# Patient Record
Sex: FEMALE | Race: WHITE | NOT HISPANIC OR LATINO | URBAN - METROPOLITAN AREA
[De-identification: names, ages, dates, MRNs, and addresses within clinical notes are randomized per-mention and may not be internally consistent; named-entity substitution may affect disease eponyms.]

---

## 2021-06-22 ENCOUNTER — OFFICE VISIT (OUTPATIENT)
Dept: URBAN - METROPOLITAN AREA CLINIC 27 | Facility: CLINIC | Age: 71
End: 2021-06-22

## 2021-06-22 PROBLEM — 266435005 GASTRO-ESOPHAGEAL REFLUX DISEASE WITHOUT ESOPHAGITIS: Status: ACTIVE | Noted: 2021-06-22

## 2021-06-22 PROBLEM — 14760008 CONSTIPATION: Status: ACTIVE | Noted: 2021-06-22

## 2021-06-22 PROBLEM — 363358000 MALIGNANT TUMOR OF LUNG: Status: ACTIVE | Noted: 2021-06-22

## 2021-06-22 PROBLEM — 59621000 ESSENTIAL HYPERTENSION: Status: ACTIVE | Noted: 2021-06-22

## 2021-07-26 ENCOUNTER — OFFICE VISIT (OUTPATIENT)
Dept: URBAN - METROPOLITAN AREA SURGERY CENTER 7 | Facility: SURGERY CENTER | Age: 71
End: 2021-07-26

## 2022-02-24 ENCOUNTER — OFFICE VISIT (OUTPATIENT)
Dept: URBAN - METROPOLITAN AREA CLINIC 27 | Facility: CLINIC | Age: 72
End: 2022-02-24

## 2022-02-24 PROBLEM — 271834000 ERUCTATION: Status: ACTIVE | Noted: 2022-02-24

## 2022-02-24 PROBLEM — 105997008 DISORDER OF BILIARY TRACT: Status: ACTIVE | Noted: 2022-02-24

## 2022-02-25 ENCOUNTER — LAB OUTSIDE AN ENCOUNTER (OUTPATIENT)
Dept: URBAN - METROPOLITAN AREA CLINIC 121 | Facility: CLINIC | Age: 72
End: 2022-02-25

## 2022-02-25 LAB
A/G RATIO: (no result)
ALBUMIN: 4.6
ALK PHOS: 84
APPEARANCE U: CLEAR
BACTERIA URN: (no result)
BILI DIRECT: 0.1
BILI INDIREC: (no result)
BILI TOTAL: 0.4
BILIRUBIN UR: NEGATIVE
EPI CELL UR: (no result)
GLOBULIN TOT: (no result)
GLUCOSE, URN: NEGATIVE
HYAL CAST UR: (no result)
KETONES URN: NEGATIVE
Lab: (no result)
Lab: NEGATIVE
PH URINE: 7.5
PROTEIN, TOT: 7.3
PROTEIN, URN: NEGATIVE
RBCS MICRO U: (no result)
SGOT (AST): 17
SGPT (ALT): 18
SPEC GR URIN: 1.01
WBCS MICRO U: (no result)
ZZ-GE-UNK: YELLOW

## 2022-04-30 ENCOUNTER — TELEPHONE ENCOUNTER (OUTPATIENT)
Dept: URBAN - METROPOLITAN AREA CLINIC 121 | Facility: CLINIC | Age: 72
End: 2022-04-30

## 2022-04-30 RX ORDER — OLMESARTAN MEDOXOMIL 20 MG/1
TABLET, FILM COATED ORAL
OUTPATIENT
Start: 2021-06-22

## 2022-04-30 RX ORDER — UMECLIDINIUM BROMIDE AND VILANTEROL TRIFENATATE 62.5; 25 UG/1; UG/1
POWDER RESPIRATORY (INHALATION)
OUTPATIENT
Start: 2021-06-22

## 2022-04-30 RX ORDER — B-COMPLEX WITH VITAMIN C
TABLET ORAL
OUTPATIENT
Start: 2021-06-22

## 2022-04-30 RX ORDER — HYDROCHLOROTHIAZIDE 25 MG/1
TABLET ORAL
OUTPATIENT
Start: 2021-06-22

## 2022-04-30 RX ORDER — LINACLOTIDE 290 UG/1
1 CAPSULE BY MOUTH ONCE A DAY CAPSULE, GELATIN COATED ORAL
OUTPATIENT
Start: 2021-06-22 | End: 2021-08-27

## 2022-04-30 RX ORDER — TRAMADOL HYDROCHLORIDE 50 MG/1
TABLET, FILM COATED ORAL
OUTPATIENT
Start: 2021-06-22

## 2022-04-30 RX ORDER — POTASSIUM CHLORIDE 750 MG/1
TABLET, EXTENDED RELEASE ORAL
OUTPATIENT
Start: 2021-06-22

## 2022-04-30 RX ORDER — CHOLECALCIFEROL (VITAMIN D3) 125 MCG
CAPSULE ORAL
OUTPATIENT
Start: 2021-06-22

## 2022-04-30 RX ORDER — FLAXSEED OIL 1000 MG
CAPSULE ORAL
OUTPATIENT
Start: 2021-06-22

## 2022-04-30 RX ORDER — LINACLOTIDE 290 UG/1
ONE CAPSULE BY MOUTH DAILY CAPSULE, GELATIN COATED ORAL
OUTPATIENT
Start: 2021-06-22

## 2022-04-30 RX ORDER — UBIDECARENONE/VIT E ACET 100MG-5
CAPSULE ORAL
OUTPATIENT
Start: 2021-06-22

## 2022-04-30 RX ORDER — CINNAMON BARK
POWDER (GRAM) MISCELLANEOUS
OUTPATIENT
Start: 2021-06-22

## 2022-04-30 RX ORDER — OMEGA-3S/DHA/EPA/FISH OIL 300-1000MG
CAPSULE ORAL
OUTPATIENT
Start: 2021-06-22

## 2022-04-30 RX ORDER — LINACLOTIDE 290 UG/1
TAKE 1 CAPSULE BY MOUTH ONCE A DAY TAKE ONE CAPSULE BY MOUTH ONE TIME DAILY IN THE MORNING 30 MINUTES BEFORE A MEAL CAPSULE, GELATIN COATED ORAL
OUTPATIENT
Start: 2021-08-27 | End: 2021-11-25

## 2022-04-30 RX ORDER — CALCIUM CARB/MAG/VITAMIN D2
CAPSULE ORAL
OUTPATIENT
Start: 2021-06-22

## 2022-05-01 ENCOUNTER — TELEPHONE ENCOUNTER (OUTPATIENT)
Dept: URBAN - METROPOLITAN AREA CLINIC 121 | Facility: CLINIC | Age: 72
End: 2022-05-01

## 2022-05-01 RX ORDER — LUBIPROSTONE 24 UG/1
TAKE 1 CAPSULE BY MOUTH TWICE A DAY 30-60 MINS AFTER MEALS CAPSULE, GELATIN COATED ORAL
Status: ACTIVE | COMMUNITY
Start: 2022-02-24 | End: 2022-06-24

## 2022-05-01 RX ORDER — FLAXSEED OIL 1000 MG
CAPSULE ORAL
Status: ACTIVE | COMMUNITY
Start: 2021-06-22

## 2022-05-01 RX ORDER — CHOLECALCIFEROL (VITAMIN D3) 125 MCG
CAPSULE ORAL
Status: ACTIVE | COMMUNITY
Start: 2021-06-22

## 2022-05-01 RX ORDER — OLMESARTAN MEDOXOMIL 20 MG/1
TABLET, FILM COATED ORAL
Status: ACTIVE | COMMUNITY
Start: 2021-06-22

## 2022-05-01 RX ORDER — UBIDECARENONE/VIT E ACET 100MG-5
CAPSULE ORAL
Status: ACTIVE | COMMUNITY
Start: 2021-06-22

## 2022-05-01 RX ORDER — LINACLOTIDE 290 UG/1
TAKE 1 CAPSULE BY MOUTH ONCE A DAY CAPSULE, GELATIN COATED ORAL
Status: ACTIVE | COMMUNITY
Start: 2022-03-30 | End: 2023-03-25

## 2022-05-01 RX ORDER — CINNAMON BARK
POWDER (GRAM) MISCELLANEOUS
Status: ACTIVE | COMMUNITY
Start: 2021-06-22

## 2022-05-01 RX ORDER — OMEGA-3S/DHA/EPA/FISH OIL 300-1000MG
CAPSULE ORAL
Status: ACTIVE | COMMUNITY
Start: 2021-06-22

## 2022-05-01 RX ORDER — HYDROCHLOROTHIAZIDE 25 MG/1
TABLET ORAL
Status: ACTIVE | COMMUNITY
Start: 2021-06-22

## 2022-05-01 RX ORDER — UMECLIDINIUM BROMIDE AND VILANTEROL TRIFENATATE 62.5; 25 UG/1; UG/1
POWDER RESPIRATORY (INHALATION)
Status: ACTIVE | COMMUNITY
Start: 2021-06-22

## 2022-05-01 RX ORDER — TRAMADOL HYDROCHLORIDE 50 MG/1
TABLET, FILM COATED ORAL
Status: ACTIVE | COMMUNITY
Start: 2021-06-22

## 2022-05-01 RX ORDER — B-COMPLEX WITH VITAMIN C
TABLET ORAL
Status: ACTIVE | COMMUNITY
Start: 2021-06-22

## 2022-05-01 RX ORDER — POTASSIUM CHLORIDE 750 MG/1
TABLET, EXTENDED RELEASE ORAL
Status: ACTIVE | COMMUNITY
Start: 2021-06-22

## 2022-06-23 ENCOUNTER — ERX REFILL RESPONSE (OUTPATIENT)
Dept: URBAN - METROPOLITAN AREA CLINIC 27 | Facility: CLINIC | Age: 72
End: 2022-06-23

## 2022-06-23 RX ORDER — LINACLOTIDE 290 UG/1
TAKE 1 CAPSULE BY MOUTH ONCE A DAY CAPSULE, GELATIN COATED ORAL
OUTPATIENT

## 2022-06-23 RX ORDER — LINACLOTIDE 290 UG/1
TAKE 1 CAPSULE BY MOUTH EVERY DAY IN THE MORNING 30 MINUTES BEFORE A MEAL CAPSULE, GELATIN COATED ORAL
Qty: 30 CAPSULE | Refills: 0 | OUTPATIENT

## 2022-07-21 ENCOUNTER — ERX REFILL RESPONSE (OUTPATIENT)
Dept: URBAN - METROPOLITAN AREA CLINIC 27 | Facility: CLINIC | Age: 72
End: 2022-07-21

## 2022-07-21 RX ORDER — LINACLOTIDE 290 UG/1
TAKE 1 CAPSULE BY MOUTH EVERY DAY IN THE MORNING 30 MINUTES BEFORE A MEAL CAPSULE, GELATIN COATED ORAL
Qty: 30 CAPSULE | Refills: 0 | OUTPATIENT

## 2022-07-21 RX ORDER — LINACLOTIDE 290 UG/1
TAKE 1 CAPSULE BY MOUTH EVERY DAY IN THE MORNING AND 30 MINUTES BEFORE A MEAL CAPSULE, GELATIN COATED ORAL
Qty: 30 CAPSULE | Refills: 0 | OUTPATIENT

## 2022-08-16 ENCOUNTER — ERX REFILL RESPONSE (OUTPATIENT)
Dept: URBAN - METROPOLITAN AREA CLINIC 27 | Facility: CLINIC | Age: 72
End: 2022-08-16

## 2022-08-16 RX ORDER — LINACLOTIDE 290 UG/1
TAKE 1 CAPSULE BY MOUTH EVERY DAY IN THE MORNING 30 MINUTES BEFORE A MEAL CAPSULE, GELATIN COATED ORAL
Qty: 30 CAPSULE | Refills: 0 | OUTPATIENT

## 2022-08-16 RX ORDER — LINACLOTIDE 290 UG/1
TAKE 1 CAPSULE BY MOUTH EVERY DAY IN THE MORNING AND 30 MINUTES BEFORE A MEAL CAPSULE, GELATIN COATED ORAL
Qty: 30 CAPSULE | Refills: 0 | OUTPATIENT

## 2022-10-14 ENCOUNTER — ERX REFILL RESPONSE (OUTPATIENT)
Dept: URBAN - METROPOLITAN AREA CLINIC 27 | Facility: CLINIC | Age: 72
End: 2022-10-14

## 2022-10-14 RX ORDER — LINACLOTIDE 290 UG/1
TAKE 1 CAPSULE BY MOUTH EVERY DAY IN THE MORNING AND 30 MINUTES BEFORE A MEAL CAPSULE, GELATIN COATED ORAL
Qty: 30 CAPSULE | Refills: 0 | OUTPATIENT

## 2022-10-14 RX ORDER — LINACLOTIDE 290 UG/1
TAKE 1 CAPSULE BY MOUTH EVERY DAY IN THE MORNING 30 MINUTES BEFORE A MEAL CAPSULE, GELATIN COATED ORAL
Qty: 30 CAPSULE | Refills: 0 | OUTPATIENT

## 2022-11-11 ENCOUNTER — ERX REFILL RESPONSE (OUTPATIENT)
Dept: URBAN - METROPOLITAN AREA CLINIC 27 | Facility: CLINIC | Age: 72
End: 2022-11-11

## 2022-11-11 RX ORDER — LINACLOTIDE 290 UG/1
TAKE 1 CAPSULE BY MOUTH EVERY DAY IN THE MORNING AND 30 MINUTES BEFORE A MEAL CAPSULE, GELATIN COATED ORAL
Qty: 30 CAPSULE | Refills: 0 | OUTPATIENT

## 2022-11-11 RX ORDER — LINACLOTIDE 290 UG/1
TAKE 1 CAPSULE BY MOUTH EVERY DAY IN THE MORNING, 30 MINUTES BEFORE A MEAL CAPSULE, GELATIN COATED ORAL
Qty: 30 CAPSULE | Refills: 0 | OUTPATIENT

## 2023-01-09 ENCOUNTER — OFFICE VISIT (OUTPATIENT)
Dept: URBAN - METROPOLITAN AREA CLINIC 27 | Facility: CLINIC | Age: 73
End: 2023-01-09
Payer: MEDICARE

## 2023-01-09 VITALS
HEIGHT: 62 IN | BODY MASS INDEX: 25.21 KG/M2 | WEIGHT: 137 LBS | HEART RATE: 128 BPM | SYSTOLIC BLOOD PRESSURE: 123 MMHG | DIASTOLIC BLOOD PRESSURE: 96 MMHG

## 2023-01-09 DIAGNOSIS — K58.1 IRRITABLE BOWEL SYNDROME WITH CONSTIPATION: ICD-10-CM

## 2023-01-09 PROCEDURE — 99213 OFFICE O/P EST LOW 20 MIN: CPT | Performed by: INTERNAL MEDICINE

## 2023-01-09 RX ORDER — TRIAMTERENE AND HYDROCHLOROTHIAZIDE 37.5; 25 MG/1; MG/1
CAPSULE ORAL
Qty: 0 | Refills: 0 | Status: ACTIVE | COMMUNITY
Start: 1900-01-01 | End: 1900-01-01

## 2023-01-09 RX ORDER — POTASSIUM CHLORIDE 750 MG/1
TABLET, FILM COATED, EXTENDED RELEASE ORAL
Qty: 0 | Refills: 0 | Status: ACTIVE | COMMUNITY
Start: 1900-01-01 | End: 1900-01-01

## 2023-01-09 RX ORDER — CHOLECALCIFEROL (VITAMIN D3) 125 MCG
CAPSULE ORAL
Status: ACTIVE | COMMUNITY
Start: 2021-06-22

## 2023-01-09 RX ORDER — LINACLOTIDE 290 UG/1
TAKE 1 CAPSULE BY MOUTH EVERY DAY IN THE MORNING, 30 MINUTES BEFORE A MEAL CAPSULE, GELATIN COATED ORAL
Qty: 30 CAPSULE | Refills: 0 | Status: ACTIVE | COMMUNITY

## 2023-01-09 RX ORDER — FLAXSEED OIL 1000 MG
CAPSULE ORAL
Status: ACTIVE | COMMUNITY
Start: 2021-06-22

## 2023-01-09 RX ORDER — UBIDECARENONE/VIT E ACET 100MG-5
CAPSULE ORAL
Status: ACTIVE | COMMUNITY
Start: 2021-06-22

## 2023-01-09 RX ORDER — SIMVASTATIN 10 MG/1
TABLET, FILM COATED ORAL
Qty: 0 | Refills: 0 | Status: ACTIVE | COMMUNITY
Start: 1900-01-01 | End: 1900-01-01

## 2023-01-09 RX ORDER — OLMESARTAN MEDOXOMIL 20 MG/1
TABLET, FILM COATED ORAL
Status: ACTIVE | COMMUNITY
Start: 2021-06-22

## 2023-01-09 RX ORDER — CINNAMON BARK
POWDER (GRAM) MISCELLANEOUS
Status: ACTIVE | COMMUNITY
Start: 2021-06-22

## 2023-01-09 RX ORDER — B-COMPLEX WITH VITAMIN C
TABLET ORAL
Status: ACTIVE | COMMUNITY
Start: 2021-06-22

## 2023-01-09 RX ORDER — TRAMADOL HYDROCHLORIDE 50 MG/1
TABLET, FILM COATED ORAL
Status: ACTIVE | COMMUNITY
Start: 2021-06-22

## 2023-01-09 RX ORDER — OMEGA-3S/DHA/EPA/FISH OIL 300-1000MG
CAPSULE ORAL
Status: ACTIVE | COMMUNITY
Start: 2021-06-22

## 2023-01-09 RX ORDER — POTASSIUM CHLORIDE 750 MG/1
TABLET, EXTENDED RELEASE ORAL
Status: ACTIVE | COMMUNITY
Start: 2021-06-22

## 2023-01-09 RX ORDER — HYDROCHLOROTHIAZIDE 25 MG/1
TABLET ORAL
Status: ACTIVE | COMMUNITY
Start: 2021-06-22

## 2023-01-09 RX ORDER — UMECLIDINIUM BROMIDE AND VILANTEROL TRIFENATATE 62.5; 25 UG/1; UG/1
POWDER RESPIRATORY (INHALATION)
Status: ACTIVE | COMMUNITY
Start: 2021-06-22

## 2023-01-09 NOTE — HPI-TODAY'S VISIT:
72-year-old female here for follow-up of constipation, abdominal pain.  She continues to have irregular bowel habits, constipation predominant.  She has occasional diarrhea.  She has abdominal pain related to the constipation, improved with bowel movements.  Denies rectal bleeding, weight loss.  She is on 290 mcg Linzess.

## 2023-02-01 ENCOUNTER — WEB ENCOUNTER (OUTPATIENT)
Dept: URBAN - METROPOLITAN AREA CLINIC 27 | Facility: CLINIC | Age: 73
End: 2023-02-01

## 2023-02-01 RX ORDER — LINACLOTIDE 290 UG/1
TAKE 1 CAPSULE BY MOUTH EVERY DAY IN THE MORNING, 30 MINUTES BEFORE A MEAL CAPSULE, GELATIN COATED ORAL ONCE A DAY
Qty: 30 | Refills: 3

## 2023-04-06 ENCOUNTER — OFFICE VISIT (OUTPATIENT)
Dept: URBAN - METROPOLITAN AREA CLINIC 27 | Facility: CLINIC | Age: 73
End: 2023-04-06
Payer: MEDICARE

## 2023-04-06 VITALS
SYSTOLIC BLOOD PRESSURE: 149 MMHG | HEART RATE: 99 BPM | HEIGHT: 62 IN | DIASTOLIC BLOOD PRESSURE: 95 MMHG | WEIGHT: 140.6 LBS | BODY MASS INDEX: 25.88 KG/M2

## 2023-04-06 DIAGNOSIS — K58.1 IRRITABLE BOWEL SYNDROME WITH CONSTIPATION: ICD-10-CM

## 2023-04-06 PROCEDURE — 99214 OFFICE O/P EST MOD 30 MIN: CPT | Performed by: INTERNAL MEDICINE

## 2023-04-06 RX ORDER — FLAXSEED OIL 1000 MG
CAPSULE ORAL
Status: ACTIVE | COMMUNITY
Start: 2021-06-22

## 2023-04-06 RX ORDER — CHOLECALCIFEROL (VITAMIN D3) 125 MCG
CAPSULE ORAL
Status: ACTIVE | COMMUNITY
Start: 2021-06-22

## 2023-04-06 RX ORDER — CINNAMON BARK
POWDER (GRAM) MISCELLANEOUS
Status: ACTIVE | COMMUNITY
Start: 2021-06-22

## 2023-04-06 RX ORDER — UBIDECARENONE/VIT E ACET 100MG-5
CAPSULE ORAL
Status: ACTIVE | COMMUNITY
Start: 2021-06-22

## 2023-04-06 RX ORDER — OLMESARTAN MEDOXOMIL 20 MG/1
TABLET, FILM COATED ORAL
Status: ACTIVE | COMMUNITY
Start: 2021-06-22

## 2023-04-06 RX ORDER — TRIAMTERENE AND HYDROCHLOROTHIAZIDE 37.5; 25 MG/1; MG/1
CAPSULE ORAL
Qty: 0 | Refills: 0 | Status: ACTIVE | COMMUNITY
Start: 1900-01-01 | End: 1900-01-01

## 2023-04-06 RX ORDER — POTASSIUM CHLORIDE 750 MG/1
TABLET, EXTENDED RELEASE ORAL
Status: ACTIVE | COMMUNITY
Start: 2021-06-22

## 2023-04-06 RX ORDER — SIMVASTATIN 10 MG/1
TABLET, FILM COATED ORAL
Qty: 0 | Refills: 0 | Status: ACTIVE | COMMUNITY
Start: 1900-01-01 | End: 1900-01-01

## 2023-04-06 RX ORDER — POTASSIUM CHLORIDE 750 MG/1
TABLET, FILM COATED, EXTENDED RELEASE ORAL
Qty: 0 | Refills: 0 | Status: ACTIVE | COMMUNITY
Start: 1900-01-01 | End: 1900-01-01

## 2023-04-06 RX ORDER — LINACLOTIDE 290 UG/1
TAKE 1 CAPSULE BY MOUTH EVERY DAY IN THE MORNING, 30 MINUTES BEFORE A MEAL CAPSULE, GELATIN COATED ORAL ONCE A DAY
Qty: 30 | Refills: 3 | Status: ACTIVE | COMMUNITY

## 2023-04-06 RX ORDER — OMEGA-3S/DHA/EPA/FISH OIL 300-1000MG
CAPSULE ORAL
Status: ACTIVE | COMMUNITY
Start: 2021-06-22

## 2023-04-06 RX ORDER — LACTULOSE 10 G/15ML
15 ML AS NEEDED SOLUTION ORAL ONCE A DAY
Qty: 450 | Refills: 3 | OUTPATIENT
Start: 2023-04-06 | End: 2023-08-04

## 2023-04-06 RX ORDER — B-COMPLEX WITH VITAMIN C
TABLET ORAL
Status: ACTIVE | COMMUNITY
Start: 2021-06-22

## 2023-04-06 RX ORDER — TRAMADOL HYDROCHLORIDE 50 MG/1
TABLET, FILM COATED ORAL
Status: ACTIVE | COMMUNITY
Start: 2021-06-22

## 2023-04-06 RX ORDER — HYDROCHLOROTHIAZIDE 25 MG/1
TABLET ORAL
Status: ACTIVE | COMMUNITY
Start: 2021-06-22

## 2023-04-06 RX ORDER — UMECLIDINIUM BROMIDE AND VILANTEROL TRIFENATATE 62.5; 25 UG/1; UG/1
POWDER RESPIRATORY (INHALATION)
Status: ACTIVE | COMMUNITY
Start: 2021-06-22

## 2023-04-06 NOTE — HPI-TODAY'S VISIT:
72-year-old female here for follow-up of constipation.  She was not having a complete response to Linzess to 90 mcg, so she was given samples of IBS Rella and Trulance, but had worse outcome with those 2 medications, so she switched back to Linzess.  Still not having complete response to Linzess 290 mcg.

## 2023-04-07 ENCOUNTER — WEB ENCOUNTER (OUTPATIENT)
Dept: URBAN - METROPOLITAN AREA SURGERY CENTER 7 | Facility: SURGERY CENTER | Age: 73
End: 2023-04-07

## 2023-04-10 ENCOUNTER — WEB ENCOUNTER (OUTPATIENT)
Dept: URBAN - METROPOLITAN AREA CLINIC 27 | Facility: CLINIC | Age: 73
End: 2023-04-10

## 2023-05-10 ENCOUNTER — P2P PATIENT RECORD (OUTPATIENT)
Age: 73
End: 2023-05-10

## 2023-06-05 ENCOUNTER — TELEPHONE ENCOUNTER (OUTPATIENT)
Dept: URBAN - METROPOLITAN AREA CLINIC 27 | Facility: CLINIC | Age: 73
End: 2023-06-05

## 2023-06-05 RX ORDER — LINACLOTIDE 290 UG/1
TAKE 1 CAPSULE BY MOUTH EVERY DAY IN THE MORNING, 30 MINUTES BEFORE A MEAL CAPSULE, GELATIN COATED ORAL ONCE A DAY
Qty: 30 | Refills: 3
End: 2023-10-03

## 2023-06-06 ENCOUNTER — TELEPHONE ENCOUNTER (OUTPATIENT)
Dept: URBAN - METROPOLITAN AREA CLINIC 27 | Facility: CLINIC | Age: 73
End: 2023-06-06

## 2023-07-06 ENCOUNTER — OFFICE VISIT (OUTPATIENT)
Dept: URBAN - METROPOLITAN AREA CLINIC 27 | Facility: CLINIC | Age: 73
End: 2023-07-06
Payer: MEDICARE

## 2023-07-06 VITALS
DIASTOLIC BLOOD PRESSURE: 98 MMHG | SYSTOLIC BLOOD PRESSURE: 147 MMHG | HEIGHT: 62 IN | BODY MASS INDEX: 25.76 KG/M2 | RESPIRATION RATE: 17 BRPM | HEART RATE: 124 BPM | WEIGHT: 140 LBS

## 2023-07-06 DIAGNOSIS — K59.09 CHRONIC CONSTIPATION: ICD-10-CM

## 2023-07-06 DIAGNOSIS — Z86.010 PERSONAL HISTORY OF COLONIC POLYPS: ICD-10-CM

## 2023-07-06 PROBLEM — 428283002 HISTORY OF POLYP OF COLON (SITUATION): Status: ACTIVE | Noted: 2021-06-22

## 2023-07-06 PROCEDURE — 99214 OFFICE O/P EST MOD 30 MIN: CPT | Performed by: INTERNAL MEDICINE

## 2023-07-06 RX ORDER — B-COMPLEX WITH VITAMIN C
TABLET ORAL
Status: ACTIVE | COMMUNITY
Start: 2021-06-22

## 2023-07-06 RX ORDER — POTASSIUM CHLORIDE 750 MG/1
TABLET, FILM COATED, EXTENDED RELEASE ORAL
Qty: 0 | Refills: 0 | Status: ACTIVE | COMMUNITY
Start: 1900-01-01 | End: 1900-01-01

## 2023-07-06 RX ORDER — CINNAMON BARK
POWDER (GRAM) MISCELLANEOUS
Status: ACTIVE | COMMUNITY
Start: 2021-06-22

## 2023-07-06 RX ORDER — UMECLIDINIUM BROMIDE AND VILANTEROL TRIFENATATE 62.5; 25 UG/1; UG/1
POWDER RESPIRATORY (INHALATION)
Status: ACTIVE | COMMUNITY
Start: 2021-06-22

## 2023-07-06 RX ORDER — HYDROCHLOROTHIAZIDE 25 MG/1
TABLET ORAL
Status: ACTIVE | COMMUNITY
Start: 2021-06-22

## 2023-07-06 RX ORDER — SIMVASTATIN 10 MG/1
TABLET, FILM COATED ORAL
Qty: 0 | Refills: 0 | Status: ACTIVE | COMMUNITY
Start: 1900-01-01 | End: 1900-01-01

## 2023-07-06 RX ORDER — OLMESARTAN MEDOXOMIL 20 MG/1
TABLET, FILM COATED ORAL
Status: ACTIVE | COMMUNITY
Start: 2021-06-22

## 2023-07-06 RX ORDER — UBIDECARENONE/VIT E ACET 100MG-5
CAPSULE ORAL
Status: ACTIVE | COMMUNITY
Start: 2021-06-22

## 2023-07-06 RX ORDER — TRIAMTERENE AND HYDROCHLOROTHIAZIDE 37.5; 25 MG/1; MG/1
CAPSULE ORAL
Qty: 0 | Refills: 0 | Status: ACTIVE | COMMUNITY
Start: 1900-01-01 | End: 1900-01-01

## 2023-07-06 RX ORDER — POTASSIUM CHLORIDE 750 MG/1
TABLET, EXTENDED RELEASE ORAL
Status: ACTIVE | COMMUNITY
Start: 2021-06-22

## 2023-07-06 RX ORDER — FLAXSEED OIL 1000 MG
CAPSULE ORAL
Status: ACTIVE | COMMUNITY
Start: 2021-06-22

## 2023-07-06 RX ORDER — LACTULOSE 10 G/15ML
15 ML AS NEEDED SOLUTION ORAL ONCE A DAY
Qty: 450 | Refills: 3 | Status: ACTIVE | COMMUNITY
Start: 2023-04-06 | End: 2023-08-04

## 2023-07-06 RX ORDER — TRAMADOL HYDROCHLORIDE 50 MG/1
TABLET, FILM COATED ORAL
Status: ACTIVE | COMMUNITY
Start: 2021-06-22

## 2023-07-06 RX ORDER — OMEGA-3S/DHA/EPA/FISH OIL 300-1000MG
CAPSULE ORAL
Status: ACTIVE | COMMUNITY
Start: 2021-06-22

## 2023-07-06 RX ORDER — LINACLOTIDE 290 UG/1
TAKE 1 CAPSULE BY MOUTH EVERY DAY IN THE MORNING, 30 MINUTES BEFORE A MEAL CAPSULE, GELATIN COATED ORAL ONCE A DAY
Qty: 30 | Refills: 3 | Status: ACTIVE | COMMUNITY
End: 2023-10-03

## 2023-07-06 RX ORDER — CHOLECALCIFEROL (VITAMIN D3) 125 MCG
CAPSULE ORAL
Status: ACTIVE | COMMUNITY
Start: 2021-06-22

## 2023-07-06 NOTE — HPI-TODAY'S VISIT:
73-year-old female here for follow-up of constipation.  She is currently on Linzess to 90 mcg which has helped, but continues to have some constipation and left lower quadrant pain associated with bowel movements.  She was given lactulose to use in addition to Linzess, but only uses it occasionally.  She has tried Trulance and IBS Rella without much improvement in the past.

## 2023-08-18 ENCOUNTER — TELEPHONE ENCOUNTER (OUTPATIENT)
Dept: URBAN - METROPOLITAN AREA CLINIC 27 | Facility: CLINIC | Age: 73
End: 2023-08-18

## 2023-08-18 RX ORDER — LINACLOTIDE 290 UG/1
1 CAPSULE AT LEAST 30 MINUTES BEFORE THE FIRST MEAL OF THE DAY ON AN EMPTY STOMACH CAPSULE, GELATIN COATED ORAL ONCE A DAY
Qty: 30 | Refills: 3 | OUTPATIENT
Start: 2023-08-18 | End: 2023-12-15

## 2023-08-21 ENCOUNTER — TELEPHONE ENCOUNTER (OUTPATIENT)
Dept: URBAN - METROPOLITAN AREA CLINIC 27 | Facility: CLINIC | Age: 73
End: 2023-08-21

## 2023-10-05 ENCOUNTER — OFFICE VISIT (OUTPATIENT)
Dept: URBAN - METROPOLITAN AREA CLINIC 27 | Facility: CLINIC | Age: 73
End: 2023-10-05

## 2023-10-16 ENCOUNTER — OFFICE VISIT (OUTPATIENT)
Dept: URBAN - METROPOLITAN AREA CLINIC 27 | Facility: CLINIC | Age: 73
End: 2023-10-16

## 2023-10-19 ENCOUNTER — OFFICE VISIT (OUTPATIENT)
Dept: URBAN - METROPOLITAN AREA CLINIC 27 | Facility: CLINIC | Age: 73
End: 2023-10-19

## 2023-10-30 ENCOUNTER — DASHBOARD ENCOUNTERS (OUTPATIENT)
Age: 73
End: 2023-10-30

## 2023-10-30 ENCOUNTER — OFFICE VISIT (OUTPATIENT)
Dept: URBAN - METROPOLITAN AREA CLINIC 27 | Facility: CLINIC | Age: 73
End: 2023-10-30
Payer: MEDICARE

## 2023-10-30 VITALS
BODY MASS INDEX: 25.4 KG/M2 | WEIGHT: 138 LBS | HEIGHT: 62 IN | DIASTOLIC BLOOD PRESSURE: 107 MMHG | SYSTOLIC BLOOD PRESSURE: 187 MMHG | HEART RATE: 105 BPM

## 2023-10-30 DIAGNOSIS — K58.1 IRRITABLE BOWEL SYNDROME WITH CONSTIPATION: ICD-10-CM

## 2023-10-30 DIAGNOSIS — Z86.010 PERSONAL HISTORY OF COLONIC POLYPS: ICD-10-CM

## 2023-10-30 PROBLEM — 440630006: Status: ACTIVE | Noted: 2023-01-09

## 2023-10-30 PROCEDURE — 99213 OFFICE O/P EST LOW 20 MIN: CPT | Performed by: INTERNAL MEDICINE

## 2023-10-30 RX ORDER — UBIDECARENONE/VIT E ACET 100MG-5
CAPSULE ORAL
Status: ACTIVE | COMMUNITY
Start: 2021-06-22

## 2023-10-30 RX ORDER — POTASSIUM CHLORIDE 750 MG/1
TABLET, FILM COATED, EXTENDED RELEASE ORAL
Qty: 0 | Refills: 0 | Status: ACTIVE | COMMUNITY
Start: 1900-01-01 | End: 1900-01-01

## 2023-10-30 RX ORDER — OLMESARTAN MEDOXOMIL 20 MG/1
TABLET, FILM COATED ORAL
Status: ACTIVE | COMMUNITY
Start: 2021-06-22

## 2023-10-30 RX ORDER — SIMVASTATIN 10 MG/1
TABLET, FILM COATED ORAL
Qty: 0 | Refills: 0 | Status: ACTIVE | COMMUNITY
Start: 1900-01-01 | End: 1900-01-01

## 2023-10-30 RX ORDER — POTASSIUM CHLORIDE 750 MG/1
TABLET, EXTENDED RELEASE ORAL
Status: ACTIVE | COMMUNITY
Start: 2021-06-22

## 2023-10-30 RX ORDER — HYDROCHLOROTHIAZIDE 25 MG/1
TABLET ORAL
Status: ACTIVE | COMMUNITY
Start: 2021-06-22

## 2023-10-30 RX ORDER — B-COMPLEX WITH VITAMIN C
TABLET ORAL
Status: ACTIVE | COMMUNITY
Start: 2021-06-22

## 2023-10-30 RX ORDER — UMECLIDINIUM BROMIDE AND VILANTEROL TRIFENATATE 62.5; 25 UG/1; UG/1
POWDER RESPIRATORY (INHALATION)
Status: ACTIVE | COMMUNITY
Start: 2021-06-22

## 2023-10-30 RX ORDER — CINNAMON BARK
POWDER (GRAM) MISCELLANEOUS
Status: ACTIVE | COMMUNITY
Start: 2021-06-22

## 2023-10-30 RX ORDER — FLAXSEED OIL 1000 MG
CAPSULE ORAL
Status: ACTIVE | COMMUNITY
Start: 2021-06-22

## 2023-10-30 RX ORDER — OMEGA-3S/DHA/EPA/FISH OIL 300-1000MG
CAPSULE ORAL
Status: ACTIVE | COMMUNITY
Start: 2021-06-22

## 2023-10-30 RX ORDER — TRIAMTERENE AND HYDROCHLOROTHIAZIDE 37.5; 25 MG/1; MG/1
CAPSULE ORAL
Qty: 0 | Refills: 0 | Status: ACTIVE | COMMUNITY
Start: 1900-01-01 | End: 1900-01-01

## 2023-10-30 RX ORDER — CHOLECALCIFEROL (VITAMIN D3) 125 MCG
CAPSULE ORAL
Status: ACTIVE | COMMUNITY
Start: 2021-06-22

## 2023-10-30 RX ORDER — TRAMADOL HYDROCHLORIDE 50 MG/1
TABLET, FILM COATED ORAL
Status: ACTIVE | COMMUNITY
Start: 2021-06-22

## 2023-10-30 RX ORDER — LINACLOTIDE 290 UG/1
1 CAPSULE AT LEAST 30 MINUTES BEFORE THE FIRST MEAL OF THE DAY ON AN EMPTY STOMACH CAPSULE, GELATIN COATED ORAL ONCE A DAY
Qty: 30 | Refills: 3 | Status: ACTIVE | COMMUNITY
Start: 2023-08-18 | End: 2023-12-15

## 2023-10-30 NOTE — HPI-TODAY'S VISIT:
73-year-old female here for follow-up of constipation, abdominal discomfort.  She is currently on 290 mcg of Linzess which has helped, but continues to have occasional constipation and left lower quadrant pain when she has constipation.  She was given lactulose to use in addition to Linzess, but only uses it occasionally.  She tried Trulance and IBS Rella in the past without much improvement.  She also has bloating and frequent rumbling in her stomach.

## 2024-01-30 ENCOUNTER — OFFICE VISIT (OUTPATIENT)
Dept: URBAN - METROPOLITAN AREA CLINIC 27 | Facility: CLINIC | Age: 74
End: 2024-01-30

## 2024-02-29 ENCOUNTER — OV EP (OUTPATIENT)
Dept: URBAN - METROPOLITAN AREA CLINIC 27 | Facility: CLINIC | Age: 74
End: 2024-02-29

## 2024-03-26 ENCOUNTER — OV EP (OUTPATIENT)
Dept: URBAN - METROPOLITAN AREA CLINIC 27 | Facility: CLINIC | Age: 74
End: 2024-03-26

## 2024-04-17 ENCOUNTER — OV EP (OUTPATIENT)
Dept: URBAN - METROPOLITAN AREA CLINIC 27 | Facility: CLINIC | Age: 74
End: 2024-04-17

## 2024-06-03 ENCOUNTER — WEB ENCOUNTER (OUTPATIENT)
Dept: URBAN - METROPOLITAN AREA CLINIC 27 | Facility: CLINIC | Age: 74
End: 2024-06-03

## 2024-06-07 ENCOUNTER — WEB ENCOUNTER (OUTPATIENT)
Dept: URBAN - METROPOLITAN AREA CLINIC 27 | Facility: CLINIC | Age: 74
End: 2024-06-07

## 2024-08-14 ENCOUNTER — OFFICE VISIT (OUTPATIENT)
Dept: URBAN - METROPOLITAN AREA CLINIC 27 | Facility: CLINIC | Age: 74
End: 2024-08-14
Payer: MEDICARE

## 2024-08-14 ENCOUNTER — LAB OUTSIDE AN ENCOUNTER (OUTPATIENT)
Dept: URBAN - METROPOLITAN AREA CLINIC 27 | Facility: CLINIC | Age: 74
End: 2024-08-14

## 2024-08-14 VITALS — HEIGHT: 62 IN

## 2024-08-14 DIAGNOSIS — R63.4 WEIGHT LOSS, UNINTENTIONAL: ICD-10-CM

## 2024-08-14 DIAGNOSIS — K58.1 IRRITABLE BOWEL SYNDROME WITH CONSTIPATION: ICD-10-CM

## 2024-08-14 DIAGNOSIS — Z86.010 PERSONAL HISTORY OF COLONIC POLYPS: ICD-10-CM

## 2024-08-14 PROCEDURE — 99214 OFFICE O/P EST MOD 30 MIN: CPT | Performed by: INTERNAL MEDICINE

## 2024-08-14 RX ORDER — LINACLOTIDE 290 UG/1
1 CAPSULE AT LEAST 30 MINUTES BEFORE THE FIRST MEAL OF THE DAY ON AN EMPTY STOMACH CAPSULE, GELATIN COATED ORAL ONCE A DAY
Qty: 90 | Refills: 5 | Status: ACTIVE | COMMUNITY
Start: 2024-04-30 | End: 2025-10-22

## 2024-08-14 RX ORDER — OLMESARTAN MEDOXOMIL 20 MG/1
TABLET, FILM COATED ORAL
Status: ACTIVE | COMMUNITY
Start: 2021-06-22

## 2024-08-14 RX ORDER — CHOLECALCIFEROL (VITAMIN D3) 125 MCG
CAPSULE ORAL
Status: ACTIVE | COMMUNITY
Start: 2021-06-22

## 2024-08-14 RX ORDER — UMECLIDINIUM BROMIDE AND VILANTEROL TRIFENATATE 62.5; 25 UG/1; UG/1
POWDER RESPIRATORY (INHALATION)
Status: ACTIVE | COMMUNITY
Start: 2021-06-22

## 2024-08-14 RX ORDER — UBIDECARENONE/VIT E ACET 100MG-5
CAPSULE ORAL
Status: ACTIVE | COMMUNITY
Start: 2021-06-22

## 2024-08-14 RX ORDER — POTASSIUM CHLORIDE 750 MG/1
TABLET, FILM COATED, EXTENDED RELEASE ORAL
Qty: 0 | Refills: 0 | Status: ACTIVE | COMMUNITY
Start: 1900-01-01

## 2024-08-14 RX ORDER — POTASSIUM CHLORIDE 750 MG/1
TABLET, EXTENDED RELEASE ORAL
Status: ACTIVE | COMMUNITY
Start: 2021-06-22

## 2024-08-14 RX ORDER — OMEGA-3S/DHA/EPA/FISH OIL 300-1000MG
CAPSULE ORAL
Status: ACTIVE | COMMUNITY
Start: 2021-06-22

## 2024-08-14 RX ORDER — B-COMPLEX WITH VITAMIN C
TABLET ORAL
Status: ACTIVE | COMMUNITY
Start: 2021-06-22

## 2024-08-14 RX ORDER — SIMVASTATIN 10 MG/1
TABLET, FILM COATED ORAL
Qty: 0 | Refills: 0 | Status: ACTIVE | COMMUNITY
Start: 1900-01-01

## 2024-08-14 RX ORDER — FLAXSEED OIL 1000 MG
CAPSULE ORAL
Status: ACTIVE | COMMUNITY
Start: 2021-06-22

## 2024-08-14 RX ORDER — DICYCLOMINE HYDROCHLORIDE 10 MG/1
1-2 CAPSULES CAPSULE ORAL
Qty: 90 | Refills: 3 | OUTPATIENT
Start: 2024-08-14 | End: 2024-12-11

## 2024-08-14 RX ORDER — HYDROCHLOROTHIAZIDE 25 MG/1
TABLET ORAL
Status: ACTIVE | COMMUNITY
Start: 2021-06-22

## 2024-08-14 RX ORDER — TRIAMTERENE AND HYDROCHLOROTHIAZIDE 37.5; 25 MG/1; MG/1
CAPSULE ORAL
Qty: 0 | Refills: 0 | Status: ACTIVE | COMMUNITY
Start: 1900-01-01

## 2024-08-14 RX ORDER — TRAMADOL HYDROCHLORIDE 50 MG/1
TABLET, FILM COATED ORAL
Status: ACTIVE | COMMUNITY
Start: 2021-06-22

## 2024-08-14 RX ORDER — CINNAMON BARK
POWDER (GRAM) MISCELLANEOUS
Status: ACTIVE | COMMUNITY
Start: 2021-06-22

## 2024-08-25 ENCOUNTER — WEB ENCOUNTER (OUTPATIENT)
Dept: URBAN - METROPOLITAN AREA CLINIC 27 | Facility: CLINIC | Age: 74
End: 2024-08-25

## 2024-08-30 ENCOUNTER — TELEPHONE ENCOUNTER (OUTPATIENT)
Dept: URBAN - METROPOLITAN AREA CLINIC 27 | Facility: CLINIC | Age: 74
End: 2024-08-30

## 2024-09-30 ENCOUNTER — OFFICE VISIT (OUTPATIENT)
Dept: URBAN - METROPOLITAN AREA CLINIC 27 | Facility: CLINIC | Age: 74
End: 2024-09-30
Payer: MEDICARE

## 2024-09-30 VITALS
HEART RATE: 127 BPM | SYSTOLIC BLOOD PRESSURE: 138 MMHG | BODY MASS INDEX: 25.58 KG/M2 | WEIGHT: 139 LBS | DIASTOLIC BLOOD PRESSURE: 85 MMHG | HEIGHT: 62 IN

## 2024-09-30 DIAGNOSIS — R63.4 WEIGHT LOSS: ICD-10-CM

## 2024-09-30 DIAGNOSIS — K58.1 IRRITABLE BOWEL SYNDROME WITH CONSTIPATION: ICD-10-CM

## 2024-09-30 DIAGNOSIS — Z86.010 PERSONAL HISTORY OF COLONIC POLYPS: ICD-10-CM

## 2024-09-30 PROCEDURE — 99213 OFFICE O/P EST LOW 20 MIN: CPT | Performed by: INTERNAL MEDICINE

## 2024-09-30 RX ORDER — CHOLECALCIFEROL (VITAMIN D3) 125 MCG
CAPSULE ORAL
Status: ACTIVE | COMMUNITY
Start: 2021-06-22

## 2024-09-30 RX ORDER — OLMESARTAN MEDOXOMIL 20 MG/1
TABLET, FILM COATED ORAL
Status: ACTIVE | COMMUNITY
Start: 2021-06-22

## 2024-09-30 RX ORDER — OMEGA-3S/DHA/EPA/FISH OIL 300-1000MG
CAPSULE ORAL
Status: ACTIVE | COMMUNITY
Start: 2021-06-22

## 2024-09-30 RX ORDER — POTASSIUM CHLORIDE 750 MG/1
TABLET, EXTENDED RELEASE ORAL
Status: ACTIVE | COMMUNITY
Start: 2021-06-22

## 2024-09-30 RX ORDER — TRAMADOL HYDROCHLORIDE 50 MG/1
TABLET, FILM COATED ORAL
Status: ACTIVE | COMMUNITY
Start: 2021-06-22

## 2024-09-30 RX ORDER — TRIAMTERENE AND HYDROCHLOROTHIAZIDE 37.5; 25 MG/1; MG/1
CAPSULE ORAL
Qty: 0 | Refills: 0 | Status: ACTIVE | COMMUNITY
Start: 1900-01-01

## 2024-09-30 RX ORDER — UBIDECARENONE/VIT E ACET 100MG-5
CAPSULE ORAL
Status: ACTIVE | COMMUNITY
Start: 2021-06-22

## 2024-09-30 RX ORDER — LINACLOTIDE 290 UG/1
1 CAPSULE AT LEAST 30 MINUTES BEFORE THE FIRST MEAL OF THE DAY ON AN EMPTY STOMACH CAPSULE, GELATIN COATED ORAL ONCE A DAY
Qty: 90 | Refills: 5 | Status: ACTIVE | COMMUNITY
Start: 2024-04-30 | End: 2025-10-22

## 2024-09-30 RX ORDER — POTASSIUM CHLORIDE 750 MG/1
TABLET, FILM COATED, EXTENDED RELEASE ORAL
Qty: 0 | Refills: 0 | Status: ACTIVE | COMMUNITY
Start: 1900-01-01

## 2024-09-30 RX ORDER — FLAXSEED OIL 1000 MG
CAPSULE ORAL
Status: ACTIVE | COMMUNITY
Start: 2021-06-22

## 2024-09-30 RX ORDER — HYDROCHLOROTHIAZIDE 25 MG/1
TABLET ORAL
Status: ACTIVE | COMMUNITY
Start: 2021-06-22

## 2024-09-30 RX ORDER — B-COMPLEX WITH VITAMIN C
TABLET ORAL
Status: ACTIVE | COMMUNITY
Start: 2021-06-22

## 2024-09-30 RX ORDER — DICYCLOMINE HYDROCHLORIDE 10 MG/1
1-2 CAPSULES CAPSULE ORAL
Qty: 90 | Refills: 3 | Status: ACTIVE | COMMUNITY
Start: 2024-08-14 | End: 2024-12-11

## 2024-09-30 RX ORDER — UMECLIDINIUM BROMIDE AND VILANTEROL TRIFENATATE 62.5; 25 UG/1; UG/1
POWDER RESPIRATORY (INHALATION)
Status: ACTIVE | COMMUNITY
Start: 2021-06-22

## 2024-09-30 RX ORDER — CINNAMON BARK
POWDER (GRAM) MISCELLANEOUS
Status: ACTIVE | COMMUNITY
Start: 2021-06-22

## 2024-09-30 RX ORDER — SIMVASTATIN 10 MG/1
TABLET, FILM COATED ORAL
Qty: 0 | Refills: 0 | Status: ACTIVE | COMMUNITY
Start: 1900-01-01

## 2024-09-30 NOTE — HPI-TODAY'S VISIT:
74-year-old female here for follow-up of abdominal pain, weight loss, IBS.  She is on Linzess which has helped with her bowel movements, but continued to have some lower abdominal pain.  She was put on dicyclomine which has helped with the abdominal pain.  She has lost some weight unintentionally, but has gained the weight back.  She had a CT scan of her chest with her oncologist that was normal.  She had a CT of abdomen and pelvis on August 28 that showed moderate-sized left spigelian hernia containing a nonobstructed loop of descending colon.  There is also a small right femoral hernia containing a nonobstructed loop of ileum.  Overall, feeling much better.